# Patient Record
Sex: FEMALE | Race: WHITE | NOT HISPANIC OR LATINO | Employment: UNEMPLOYED | URBAN - NONMETROPOLITAN AREA
[De-identification: names, ages, dates, MRNs, and addresses within clinical notes are randomized per-mention and may not be internally consistent; named-entity substitution may affect disease eponyms.]

---

## 2017-05-12 ENCOUNTER — TRANSCRIBE ORDERS (OUTPATIENT)
Dept: ULTRASOUND IMAGING | Facility: HOSPITAL | Age: 37
End: 2017-05-12

## 2017-05-12 DIAGNOSIS — Z01.411 ABNORMAL FEMALE PELVIC EXAM: Primary | ICD-10-CM

## 2017-05-16 ENCOUNTER — TRANSCRIBE ORDERS (OUTPATIENT)
Dept: ADMINISTRATIVE | Facility: HOSPITAL | Age: 37
End: 2017-05-16

## 2017-05-23 ENCOUNTER — TRANSCRIBE ORDERS (OUTPATIENT)
Dept: ULTRASOUND IMAGING | Facility: HOSPITAL | Age: 37
End: 2017-05-23

## 2017-05-26 ENCOUNTER — HOSPITAL ENCOUNTER (OUTPATIENT)
Dept: ULTRASOUND IMAGING | Facility: HOSPITAL | Age: 37
Discharge: HOME | End: 2017-05-26
Admitting: NURSE PRACTITIONER

## 2017-05-26 DIAGNOSIS — Z01.411 ABNORMAL FEMALE PELVIC EXAM: ICD-10-CM

## 2017-05-26 PROCEDURE — 76830 TRANSVAGINAL US NON-OB: CPT

## 2017-06-28 ENCOUNTER — TRANSCRIBE ORDERS (OUTPATIENT)
Dept: ADMINISTRATIVE | Facility: HOSPITAL | Age: 37
End: 2017-06-28

## 2017-06-28 ENCOUNTER — APPOINTMENT (OUTPATIENT)
Dept: LAB | Facility: HOSPITAL | Age: 37
End: 2017-06-28

## 2017-06-28 DIAGNOSIS — F33.1 MAJOR DEPRESSIVE DISORDER, RECURRENT EPISODE, MODERATE (HCC): Primary | ICD-10-CM

## 2017-06-28 DIAGNOSIS — Z79.899 LONG TERM USE OF DRUG: ICD-10-CM

## 2017-06-28 LAB
BASOPHILS # BLD AUTO: 0.07 10*3/MM3 (ref 0–0.2)
BASOPHILS NFR BLD AUTO: 1 % (ref 0–2.5)
CHOLEST SERPL-MCNC: 183 MG/DL (ref 0–199)
DEPRECATED RDW RBC AUTO: 41.4 FL (ref 37–54)
EOSINOPHIL # BLD AUTO: 0.25 10*3/MM3 (ref 0–0.7)
EOSINOPHIL NFR BLD AUTO: 3.4 % (ref 0–7)
ERYTHROCYTE [DISTWIDTH] IN BLOOD BY AUTOMATED COUNT: 11.9 % (ref 11.5–14.5)
GLUCOSE BLD-MCNC: 93 MG/DL (ref 74–98)
HBA1C MFR BLD: 4.8 % (ref 3–6)
HCT VFR BLD AUTO: 41.1 % (ref 37–47)
HDLC SERPL-MCNC: 43 MG/DL (ref 40–60)
HGB BLD-MCNC: 13.7 G/DL (ref 12–16)
IMM GRANULOCYTES # BLD: 0.05 10*3/MM3 (ref 0–0.06)
IMM GRANULOCYTES NFR BLD: 0.7 % (ref 0–0.6)
LDLC SERPL CALC-MCNC: 119 MG/DL (ref 0–99)
LDLC/HDLC SERPL: 2.78 {RATIO}
LYMPHOCYTES # BLD AUTO: 2.58 10*3/MM3 (ref 0.6–3.4)
LYMPHOCYTES NFR BLD AUTO: 35.4 % (ref 10–50)
MCH RBC QN AUTO: 31.8 PG (ref 27–31)
MCHC RBC AUTO-ENTMCNC: 33.3 G/DL (ref 30–37)
MCV RBC AUTO: 95.4 FL (ref 81–99)
MONOCYTES # BLD AUTO: 0.46 10*3/MM3 (ref 0–0.9)
MONOCYTES NFR BLD AUTO: 6.3 % (ref 0–12)
NEUTROPHILS # BLD AUTO: 3.87 10*3/MM3 (ref 2–6.9)
NEUTROPHILS NFR BLD AUTO: 53.2 % (ref 37–80)
NRBC BLD MANUAL-RTO: 0 /100 WBC (ref 0–0)
PLATELET # BLD AUTO: 292 10*3/MM3 (ref 130–400)
PMV BLD AUTO: 10.1 FL (ref 6–12)
RBC # BLD AUTO: 4.31 10*6/MM3 (ref 4.2–5.4)
TRIGL SERPL-MCNC: 103 MG/DL
VLDLC SERPL-MCNC: 20.6 MG/DL
WBC NRBC COR # BLD: 7.28 10*3/MM3 (ref 4.8–10.8)

## 2017-06-28 PROCEDURE — 83036 HEMOGLOBIN GLYCOSYLATED A1C: CPT | Performed by: NURSE PRACTITIONER

## 2017-06-28 PROCEDURE — 85025 COMPLETE CBC W/AUTO DIFF WBC: CPT | Performed by: NURSE PRACTITIONER

## 2017-06-28 PROCEDURE — 36415 COLL VENOUS BLD VENIPUNCTURE: CPT | Performed by: NURSE PRACTITIONER

## 2017-06-28 PROCEDURE — 82947 ASSAY GLUCOSE BLOOD QUANT: CPT | Performed by: NURSE PRACTITIONER

## 2017-06-28 PROCEDURE — 80061 LIPID PANEL: CPT | Performed by: NURSE PRACTITIONER

## 2018-06-22 ENCOUNTER — HOSPITAL ENCOUNTER (OUTPATIENT)
Dept: CARDIOLOGY | Facility: HOSPITAL | Age: 38
Discharge: HOME | End: 2018-06-22
Admitting: NURSE PRACTITIONER

## 2018-06-22 ENCOUNTER — LAB (OUTPATIENT)
Dept: LAB | Facility: HOSPITAL | Age: 38
End: 2018-06-22

## 2018-06-22 ENCOUNTER — TRANSCRIBE ORDERS (OUTPATIENT)
Dept: LAB | Facility: HOSPITAL | Age: 38
End: 2018-06-22

## 2018-06-22 DIAGNOSIS — Z79.899 DRUG THERAPY: ICD-10-CM

## 2018-06-22 DIAGNOSIS — Z79.899 DRUG THERAPY: Primary | ICD-10-CM

## 2018-06-22 LAB
ALBUMIN SERPL-MCNC: 4.1 G/DL (ref 3.5–5)
ALBUMIN/GLOB SERPL: 1.2 G/DL (ref 1–2)
ALP SERPL-CCNC: 75 U/L (ref 38–126)
ALT SERPL W P-5'-P-CCNC: 20 U/L (ref 13–69)
ANION GAP SERPL CALCULATED.3IONS-SCNC: 13.1 MMOL/L (ref 10–20)
AST SERPL-CCNC: 18 U/L (ref 15–46)
BILIRUB SERPL-MCNC: 0.2 MG/DL (ref 0.2–1.3)
BUN BLD-MCNC: 7 MG/DL (ref 7–20)
BUN/CREAT SERPL: 8.8 (ref 7.1–23.5)
CALCIUM SPEC-SCNC: 8.9 MG/DL (ref 8.4–10.2)
CHLORIDE SERPL-SCNC: 112 MMOL/L (ref 98–107)
CHOLEST SERPL-MCNC: 168 MG/DL (ref 0–199)
CO2 SERPL-SCNC: 22 MMOL/L (ref 26–30)
CREAT BLD-MCNC: 0.8 MG/DL (ref 0.6–1.3)
DEPRECATED RDW RBC AUTO: 43 FL (ref 37–54)
ERYTHROCYTE [DISTWIDTH] IN BLOOD BY AUTOMATED COUNT: 12.1 % (ref 11.5–14.5)
GFR SERPL CREATININE-BSD FRML MDRD: 81 ML/MIN/1.73
GLOBULIN UR ELPH-MCNC: 3.5 GM/DL
GLUCOSE BLD-MCNC: 93 MG/DL (ref 74–98)
HBA1C MFR BLD: 5 % (ref 3–6)
HCT VFR BLD AUTO: 42.4 % (ref 37–47)
HDLC SERPL-MCNC: 37 MG/DL (ref 40–60)
HGB BLD-MCNC: 14.1 G/DL (ref 12–16)
LDLC SERPL CALC-MCNC: 109 MG/DL (ref 0–99)
LDLC/HDLC SERPL: 2.96 {RATIO}
MCH RBC QN AUTO: 32 PG (ref 27–31)
MCHC RBC AUTO-ENTMCNC: 33.3 G/DL (ref 30–37)
MCV RBC AUTO: 96.4 FL (ref 81–99)
PLATELET # BLD AUTO: 292 10*3/MM3 (ref 130–400)
PMV BLD AUTO: 10.5 FL (ref 6–12)
POTASSIUM BLD-SCNC: 4.1 MMOL/L (ref 3.5–5.1)
PROT SERPL-MCNC: 7.6 G/DL (ref 6.3–8.2)
RBC # BLD AUTO: 4.4 10*6/MM3 (ref 4.2–5.4)
SODIUM BLD-SCNC: 143 MMOL/L (ref 137–145)
TRIGL SERPL-MCNC: 108 MG/DL
TSH SERPL DL<=0.05 MIU/L-ACNC: 1.3 MIU/ML (ref 0.47–4.68)
VLDLC SERPL-MCNC: 21.6 MG/DL
WBC NRBC COR # BLD: 10.83 10*3/MM3 (ref 4.8–10.8)

## 2018-06-22 PROCEDURE — 80061 LIPID PANEL: CPT

## 2018-06-22 PROCEDURE — 36415 COLL VENOUS BLD VENIPUNCTURE: CPT

## 2018-06-22 PROCEDURE — 84443 ASSAY THYROID STIM HORMONE: CPT

## 2018-06-22 PROCEDURE — 85027 COMPLETE CBC AUTOMATED: CPT

## 2018-06-22 PROCEDURE — 83036 HEMOGLOBIN GLYCOSYLATED A1C: CPT

## 2018-06-22 PROCEDURE — 80053 COMPREHEN METABOLIC PANEL: CPT

## 2018-06-22 PROCEDURE — 93005 ELECTROCARDIOGRAM TRACING: CPT | Performed by: NURSE PRACTITIONER

## 2019-07-25 ENCOUNTER — TRANSCRIBE ORDERS (OUTPATIENT)
Dept: ADMINISTRATIVE | Facility: HOSPITAL | Age: 39
End: 2019-07-25

## 2019-07-25 DIAGNOSIS — G40.909 SEIZURE DISORDER (HCC): Primary | ICD-10-CM

## 2019-08-08 ENCOUNTER — HOSPITAL ENCOUNTER (OUTPATIENT)
Dept: CARDIOLOGY | Facility: HOSPITAL | Age: 39
Discharge: HOME | End: 2019-08-08

## 2019-08-08 ENCOUNTER — TRANSCRIBE ORDERS (OUTPATIENT)
Dept: CARDIOLOGY | Facility: HOSPITAL | Age: 39
End: 2019-08-08

## 2019-08-08 ENCOUNTER — HOSPITAL ENCOUNTER (OUTPATIENT)
Dept: MRI IMAGING | Facility: HOSPITAL | Age: 39
Discharge: HOME | End: 2019-08-08
Admitting: NURSE PRACTITIONER

## 2019-08-08 DIAGNOSIS — Z79.899 DRUG THERAPY: Primary | ICD-10-CM

## 2019-08-08 DIAGNOSIS — G40.909 SEIZURE DISORDER (HCC): ICD-10-CM

## 2019-08-08 DIAGNOSIS — Z79.899 DRUG THERAPY: ICD-10-CM

## 2019-08-08 PROCEDURE — 70551 MRI BRAIN STEM W/O DYE: CPT

## 2019-08-08 PROCEDURE — 93005 ELECTROCARDIOGRAM TRACING: CPT | Performed by: NURSE PRACTITIONER

## 2020-03-16 ENCOUNTER — TRANSCRIBE ORDERS (OUTPATIENT)
Dept: CARDIOLOGY | Facility: HOSPITAL | Age: 40
End: 2020-03-16

## 2020-03-16 ENCOUNTER — HOSPITAL ENCOUNTER (OUTPATIENT)
Dept: CARDIOLOGY | Facility: HOSPITAL | Age: 40
Discharge: HOME | End: 2020-03-16
Admitting: NURSE PRACTITIONER

## 2020-03-16 DIAGNOSIS — Z79.899 DRUG THERAPY: Primary | ICD-10-CM

## 2020-03-16 DIAGNOSIS — Z79.899 DRUG THERAPY: ICD-10-CM

## 2020-03-16 PROCEDURE — 93005 ELECTROCARDIOGRAM TRACING: CPT | Performed by: NURSE PRACTITIONER

## 2020-10-12 ENCOUNTER — LAB (OUTPATIENT)
Dept: LAB | Facility: HOSPITAL | Age: 40
End: 2020-10-12

## 2020-10-12 ENCOUNTER — TRANSCRIBE ORDERS (OUTPATIENT)
Dept: LAB | Facility: HOSPITAL | Age: 40
End: 2020-10-12

## 2020-10-12 DIAGNOSIS — Z79.899 ENCOUNTER FOR LONG-TERM (CURRENT) USE OF OTHER MEDICATIONS: ICD-10-CM

## 2020-10-12 DIAGNOSIS — Z79.899 ENCOUNTER FOR LONG-TERM (CURRENT) USE OF OTHER MEDICATIONS: Primary | ICD-10-CM

## 2020-10-12 LAB
ALBUMIN SERPL-MCNC: 4.1 G/DL (ref 3.5–5.2)
ALBUMIN/GLOB SERPL: 1.4 G/DL
ALP SERPL-CCNC: 83 U/L (ref 39–117)
ALT SERPL W P-5'-P-CCNC: 12 U/L (ref 1–33)
ANION GAP SERPL CALCULATED.3IONS-SCNC: 8.3 MMOL/L (ref 5–15)
AST SERPL-CCNC: 13 U/L (ref 1–32)
BILIRUB SERPL-MCNC: 0.2 MG/DL (ref 0–1.2)
BUN SERPL-MCNC: 12 MG/DL (ref 6–20)
BUN/CREAT SERPL: 16.4 (ref 7–25)
CALCIUM SPEC-SCNC: 9 MG/DL (ref 8.6–10.5)
CHLORIDE SERPL-SCNC: 112 MMOL/L (ref 98–107)
CHOLEST SERPL-MCNC: 185 MG/DL (ref 0–200)
CO2 SERPL-SCNC: 20.7 MMOL/L (ref 22–29)
CREAT SERPL-MCNC: 0.73 MG/DL (ref 0.57–1)
DEPRECATED RDW RBC AUTO: 39.7 FL (ref 37–54)
ERYTHROCYTE [DISTWIDTH] IN BLOOD BY AUTOMATED COUNT: 11.7 % (ref 12.3–15.4)
GFR SERPL CREATININE-BSD FRML MDRD: 89 ML/MIN/1.73
GLOBULIN UR ELPH-MCNC: 3 GM/DL
GLUCOSE SERPL-MCNC: 92 MG/DL (ref 65–99)
HBA1C MFR BLD: 5.2 % (ref 4.8–5.6)
HCT VFR BLD AUTO: 44.1 % (ref 34–46.6)
HDLC SERPL-MCNC: 41 MG/DL (ref 40–60)
HGB BLD-MCNC: 14.7 G/DL (ref 12–15.9)
LDLC SERPL CALC-MCNC: 122 MG/DL (ref 0–100)
LDLC/HDLC SERPL: 2.92 {RATIO}
MCH RBC QN AUTO: 30.8 PG (ref 26.6–33)
MCHC RBC AUTO-ENTMCNC: 33.3 G/DL (ref 31.5–35.7)
MCV RBC AUTO: 92.5 FL (ref 79–97)
PLATELET # BLD AUTO: 325 10*3/MM3 (ref 140–450)
PMV BLD AUTO: 11 FL (ref 6–12)
POTASSIUM SERPL-SCNC: 3.9 MMOL/L (ref 3.5–5.2)
PROT SERPL-MCNC: 7.1 G/DL (ref 6–8.5)
RBC # BLD AUTO: 4.77 10*6/MM3 (ref 3.77–5.28)
SODIUM SERPL-SCNC: 141 MMOL/L (ref 136–145)
TRIGL SERPL-MCNC: 122 MG/DL (ref 0–150)
TSH SERPL DL<=0.05 MIU/L-ACNC: 1.76 UIU/ML (ref 0.27–4.2)
VLDLC SERPL-MCNC: 22 MG/DL (ref 5–40)
WBC # BLD AUTO: 10.27 10*3/MM3 (ref 3.4–10.8)

## 2020-10-12 PROCEDURE — 83036 HEMOGLOBIN GLYCOSYLATED A1C: CPT

## 2020-10-12 PROCEDURE — 80061 LIPID PANEL: CPT

## 2020-10-12 PROCEDURE — 80053 COMPREHEN METABOLIC PANEL: CPT

## 2020-10-12 PROCEDURE — 36415 COLL VENOUS BLD VENIPUNCTURE: CPT

## 2020-10-12 PROCEDURE — 84443 ASSAY THYROID STIM HORMONE: CPT

## 2020-10-12 PROCEDURE — 85027 COMPLETE CBC AUTOMATED: CPT

## 2021-02-04 ENCOUNTER — TRANSCRIBE ORDERS (OUTPATIENT)
Dept: MAMMOGRAPHY | Facility: HOSPITAL | Age: 41
End: 2021-02-04

## 2021-02-04 DIAGNOSIS — Z12.31 VISIT FOR SCREENING MAMMOGRAM: Primary | ICD-10-CM

## 2021-02-25 ENCOUNTER — OFFICE VISIT (OUTPATIENT)
Dept: OTOLARYNGOLOGY | Facility: CLINIC | Age: 41
End: 2021-02-25

## 2021-02-25 VITALS
BODY MASS INDEX: 35.74 KG/M2 | SYSTOLIC BLOOD PRESSURE: 104 MMHG | WEIGHT: 177.3 LBS | HEIGHT: 59 IN | DIASTOLIC BLOOD PRESSURE: 66 MMHG

## 2021-02-25 DIAGNOSIS — H91.93 BILATERAL HEARING LOSS, UNSPECIFIED HEARING LOSS TYPE: ICD-10-CM

## 2021-02-25 DIAGNOSIS — H61.23 BILATERAL IMPACTED CERUMEN: ICD-10-CM

## 2021-02-25 DIAGNOSIS — H91.93 DEAF, BILATERAL: Primary | ICD-10-CM

## 2021-02-25 DIAGNOSIS — Z82.79: ICD-10-CM

## 2021-02-25 DIAGNOSIS — Z82.2 FAMILY HISTORY OF DEAFNESS OR HEARING LOSS: ICD-10-CM

## 2021-02-25 DIAGNOSIS — Z98.2 VP (VENTRICULOPERITONEAL) SHUNT STATUS: ICD-10-CM

## 2021-02-25 DIAGNOSIS — J34.89 NASAL OBSTRUCTION: ICD-10-CM

## 2021-02-25 DIAGNOSIS — H90.5 CONGENITAL HEARING LOSS OF BOTH EARS: ICD-10-CM

## 2021-02-25 PROCEDURE — 99205 OFFICE O/P NEW HI 60 MIN: CPT | Performed by: OTOLARYNGOLOGY

## 2021-02-25 PROCEDURE — 69210 REMOVE IMPACTED EAR WAX UNI: CPT | Performed by: OTOLARYNGOLOGY

## 2021-02-25 RX ORDER — QUETIAPINE FUMARATE 400 MG/1
TABLET, FILM COATED ORAL
COMMUNITY
Start: 2021-02-23

## 2021-02-25 RX ORDER — BUSPIRONE HYDROCHLORIDE 30 MG/1
TABLET ORAL
COMMUNITY
Start: 2020-12-18

## 2021-02-25 RX ORDER — PROPRANOLOL HYDROCHLORIDE 10 MG/1
TABLET ORAL
COMMUNITY
Start: 2021-02-23

## 2021-02-25 RX ORDER — VORTIOXETINE 20 MG/1
TABLET, FILM COATED ORAL
COMMUNITY
Start: 2020-12-18

## 2021-02-25 RX ORDER — PRAZOSIN HYDROCHLORIDE 2 MG/1
CAPSULE ORAL
COMMUNITY
Start: 2021-02-10 | End: 2021-02-25

## 2021-02-25 RX ORDER — PRAZOSIN HYDROCHLORIDE 2 MG/1
2 CAPSULE ORAL NIGHTLY
COMMUNITY

## 2021-03-12 ENCOUNTER — HOSPITAL ENCOUNTER (OUTPATIENT)
Dept: MRI IMAGING | Facility: HOSPITAL | Age: 41
Discharge: HOME | End: 2021-03-12

## 2021-03-12 ENCOUNTER — HOSPITAL ENCOUNTER (OUTPATIENT)
Dept: CT IMAGING | Facility: HOSPITAL | Age: 41
Discharge: HOME | End: 2021-03-12

## 2021-03-12 DIAGNOSIS — H91.93 DEAF, BILATERAL: ICD-10-CM

## 2021-03-12 DIAGNOSIS — Z82.2 FAMILY HISTORY OF DEAFNESS OR HEARING LOSS: ICD-10-CM

## 2021-03-12 DIAGNOSIS — Z82.79: ICD-10-CM

## 2021-03-12 PROCEDURE — A9577 INJ MULTIHANCE: HCPCS | Performed by: OTOLARYNGOLOGY

## 2021-03-12 PROCEDURE — 70553 MRI BRAIN STEM W/O & W/DYE: CPT

## 2021-03-12 PROCEDURE — 0 GADOBENATE DIMEGLUMINE 529 MG/ML SOLUTION: Performed by: OTOLARYNGOLOGY

## 2021-03-12 PROCEDURE — 70480 CT ORBIT/EAR/FOSSA W/O DYE: CPT

## 2021-03-12 RX ADMIN — GADOBENATE DIMEGLUMINE 15 ML: 529 INJECTION, SOLUTION INTRAVENOUS at 10:42

## 2021-04-22 ENCOUNTER — OFFICE VISIT (OUTPATIENT)
Dept: OTOLARYNGOLOGY | Facility: CLINIC | Age: 41
End: 2021-04-22

## 2021-04-22 VITALS
WEIGHT: 181 LBS | DIASTOLIC BLOOD PRESSURE: 74 MMHG | BODY MASS INDEX: 36.49 KG/M2 | SYSTOLIC BLOOD PRESSURE: 106 MMHG | TEMPERATURE: 97.1 F | HEIGHT: 59 IN

## 2021-04-22 DIAGNOSIS — F32.2 CURRENT SEVERE EPISODE OF MAJOR DEPRESSIVE DISORDER WITHOUT PSYCHOTIC FEATURES, UNSPECIFIED WHETHER RECURRENT (HCC): ICD-10-CM

## 2021-04-22 DIAGNOSIS — Z82.2 FAMILY HISTORY OF DEAFNESS OR HEARING LOSS: ICD-10-CM

## 2021-04-22 DIAGNOSIS — H61.23 BILATERAL IMPACTED CERUMEN: ICD-10-CM

## 2021-04-22 DIAGNOSIS — H90.5 CONGENITAL HEARING LOSS OF BOTH EARS: ICD-10-CM

## 2021-04-22 DIAGNOSIS — H91.93 BILATERAL HEARING LOSS, UNSPECIFIED HEARING LOSS TYPE: ICD-10-CM

## 2021-04-22 DIAGNOSIS — Z98.2 VP (VENTRICULOPERITONEAL) SHUNT STATUS: ICD-10-CM

## 2021-04-22 DIAGNOSIS — H91.93 DEAF, BILATERAL: Primary | ICD-10-CM

## 2021-04-22 DIAGNOSIS — Z82.79: ICD-10-CM

## 2021-04-22 DIAGNOSIS — J34.89 NASAL OBSTRUCTION: ICD-10-CM

## 2021-04-22 PROCEDURE — 99214 OFFICE O/P EST MOD 30 MIN: CPT | Performed by: OTOLARYNGOLOGY

## 2021-04-22 RX ORDER — TOPIRAMATE 50 MG/1
50 TABLET, FILM COATED ORAL 2 TIMES DAILY
COMMUNITY
Start: 2021-03-31

## 2021-04-22 NOTE — PROGRESS NOTES
"       ENT New Office Consult Note     Date of Consult: 2021     Patient Name: Bryce Walker  MRN: [id number]   : 1980     Referring Provider: No ref. provider found    Care Team: Patient Care Team:  Steffany Silvestre APRN as PCP - General (Family Medicine)  Violetta Wheat APRN as Nurse Practitioner (Family Medicine)     Chief Complaint:    Chief Complaint   Patient presents with   • Follow-up     Patient is here to follow up on test results       History of Present Illness: Bryce Walker is a 40 y.o. female who presents today for FOLLOWUP EARS.      SINCE LAST VISIT CT TEMPORAL BONE AND MRI IAC W/WO HAVE BEEN PERFORMED. AUDIOGRAM PERFORMED BUT PENDING REVIEW FROM [facility].    NO NEW SYMPTOMS SINCE LAST VISIT.    PATIENT WAS SEEN WITH .        2021:  Bryce Walker is a 40 y.o. female who presents today for ears.  SHE HAS A COMPLEX OTOLOGIC HISTORY AS OUTLINED BELOW. SHE WAS SEEN WITH .      BRYCE WAS BORN 3MO PREMATURE, IN NICU FOR 3MO. UNSURE ABOUT  HEARING SCREEN. SHE THINKS SHE WAS BORN WITH \"NORMAL\" HEARING. SHE REPORTS LOSING HER HEARING AT A VERY YOUNG AGE. SHE HAS USED HEARING AIDS FOR YEARS BUT COMMUNICATES ENTIRELY NOW BY ASL. SHE HAS A HISTORY OF  SHUNT. RIGHT EAR SUDDEN LOSS AT 20YO. 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 THIS WAS IN  - SHE WANTED HEARING AIDS BUT HER PARENTS SAID NO. MOM KNOWS ASL A BIT AND SHE IS HARD OF HEARING.      MOM WAS BORN WITH HEARING LOSS. DAD BORN HEARING. MOTHER'S MOTHER'S SISTER (MGM SISTER) HAS WAARDENBURG SYNDROME.     FAMILY HISTORY OF WAARDENBURG SYNDROME. MOTHER HAD ACOUSTIC NEUROMA ONLY ON ONE SIDE.      SHE WONDERS ABOUT MANY THING INCLUDING: WHETHER SHE ALSO HAS A TUMOR LIKE HER MOTHER, WHETHER SHE COULD GET COCHLEAR IMPLANTS, WHETHER SHE HAS A GENETIC CONDITION THAT COULD BE PASSED DOWN TO ANY FUTURE CHILDREN (NO CHILDREN AT " THIS POINT).         SHE SEPARATELY HAS WRITTEN NOTES ABOUT NASAL OBSTRUCTION/FRACTURES WHICH WAS NOT ADDRESSED AT THIS VISIT.          Subjective      Review of Systems:   Review of Systems   I have reviewed and confirmed the accuracy of the ROS as documented by the MA/LPN/RN Tamika Cueto MD     Pertinent items are noted in HPI.     Past Medical History:   Past Medical History:   Diagnosis Date   • Anemia    • Anxiety    • Depression    • Headache    • Hydrocephalus (CMS/HCC)    • Psychiatric illness    • Seizures (CMS/HCC)    • Sleep related rhythmic movement disorder    • Stomach ulcer    • Suicide attempt (CMS/HCC)        Past Surgical History:   Past Surgical History:   Procedure Laterality Date   • BRAIN SURGERY  2009, 1990, 1993,   • BUNIONECTOMY Left 2001   • CYST REMOVAL  1996    L wrist   •  SHUNT INSERTION  1980       Family History:   Family History   Problem Relation Age of Onset   • Bipolar disorder Mother    • Alcohol abuse Mother        Social History:   Social History     Socioeconomic History   • Marital status: Single     Spouse name: Not on file   • Number of children: Not on file   • Years of education: Not on file   • Highest education level: Not on file   Tobacco Use   • Smoking status: Never Smoker   • Smokeless tobacco: Never Used   Vaping Use   • Vaping Use: Never used   Substance and Sexual Activity   • Alcohol use: No   • Drug use: No   • Sexual activity: Defer       Medications:     Current Outpatient Medications:   •  busPIRone (BUSPAR) 30 MG tablet, , Disp: , Rfl:   •  prazosin (MINIPRESS) 2 MG capsule, Take 2 mg by mouth Every Night., Disp: , Rfl:   •  propranolol (INDERAL) 10 MG tablet, , Disp: , Rfl:   •  QUEtiapine (SEROquel) 400 MG tablet, , Disp: , Rfl:   •  topiramate (TOPAMAX) 50 MG tablet, Take 50 mg by mouth 2 (Two) Times a Day., Disp: , Rfl:   •  Trintellix 20 MG tablet, , Disp: , Rfl:     Allergies:   Allergies   Allergen Reactions   • Geodon [Ziprasidone Hcl]  "Other (See Comments)     Pt becomes hypertensive and develops angioedema   • Medroxyprogesterone Swelling   • Milk-Related Compounds GI Intolerance   • Prednisone Hives   • Morphine And Related Rash   • Nickel Rash   • Risperidone And Related Rash   • Sumatriptan Rash       Objective     Physical Exam:  Vital Signs:   Vitals:    04/22/21 1115   BP: 106/74   Temp: 97.1 °F (36.2 °C)   Weight: 82.1 kg (181 lb)   Height: 149.9 cm (59\")     Body mass index is 36.56 kg/m².     Physical Exam   Ears: hearing with SIGNIFICANT difficulty, auricles intact without deformity, external auditory canals clear    RIGHT: tympanic membrane intact without perforation or visible effusion NO VISIBLE EAC MASSES    LEFT: tympanic membrane intact without perforation or visible effusion    General: alert, interactive, no acute distress  Head: normocephalic, atraumatic  Nose: no significant external deformity, no epistaxis   Mouth: lips intact without deformity no oral mucosal lesions of concern, no oropharyngeal lesions or significant tonsillar asymmetry, no bleeding  Neck: trachea midline, no neck asymmetry   Lung: no stridor or stertor, no respiratory distress  Cardiovascular: no cyanosis  Skin: no concerning skin lesions on face  Neuro: Cranial nerves II-XII otherwise grossly intact  Eye: no pathologic nystagmus  Extremities: no motor asymmetry on gross examination  Psych: appropriately interactive       Results Review:   Labs:      Imaging:   MRI Internal Auditory Canal With Wo    Result Date: 3/12/2021  1. No evidence of a cerebellopontine angle mass or normal enhancement involving the distal fibula cochlear nerves. 2. 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    This " report was finalized on 3/12/2021 11:36 AM by Vannesa Chandler M.D..        CT Temporal Bones Without Contrast    Result Date: 3/15/2021  Unremarkable CT of the temporal bones.     This study was performed with techniques to keep radiation doses as low as reasonably achievable (ALARA). Individualized dose reduction techniques using automated exposure control or adjustment of mA and/or kV according to the patient size were employed.   This report was finalized on 3/15/2021 3:13 PM by Vannesa Chandler M.D..      These studies were independently reviewed today.         REVIEWED WITH PATIENT IN CLINIC TODAY AS WELL           AUDIOLOGY REPORT PENDING REVIEW (BEING FAXED NOW)                  Assessment / Plan      Assessment/Plan:   Diagnoses and all orders for this visit:    1. Deaf, bilateral (Primary)  -     Ambulatory Referral to ENT (Otolaryngology)  -     Ambulatory Referral to Genetics    2. Bilateral hearing loss, unspecified hearing loss type    3. Congenital hearing loss of both ears    4. Nasal obstruction    5. Bilateral impacted cerumen    6. Family history of deafness or hearing loss    7. Family history of Waardenburg syndrome    8. Current severe episode of major depressive disorder without psychotic features, unspecified whether recurrent (CMS/HCC)    9.  (ventriculoperitoneal) shunt status         REVIEWED CT TEMPORAL BONE AND MRI IAC W/WO WITH BRYCE TODAY. NO OVERT ABNORMALITIES TO SUGGEST VESTIBULAR SCHWANNOMAS (LIKE HER MOTHER HAD) OR OBVIOUS INNER EAR MALFORMATION TO EXPLAIN HER HEARING LOSS.     RECOMMEND REFERRAL TO [facility] FOR PATIENT'S INTEREST IN GENETIC EVALUATION IN SETTING OF FAMILY HISTORY (HEARING LOSS, WAARDENBURG, ACOUSTIC NEUROMA).     **    AUDIOGRAM WAS RECEIVED AFTER PATIENT DISCHARGE FROM CLINIC TODAY:  4/12/21 [facility] HEARING (SEE ABOVE) SHOWS RIGHT NORMAL SLOPING TO MILD-MOD SNHL 92% WRS. LEFT MILD-MOD SNHL, 96% WRS.   WITH THIS INFORMATION, SHE IS BEST ADVISED TO  CONTINUE HEARING AID USE. SHE CAN CONTINUE TO COMMUNICATE WITH ASL AS IS HOW WE HAVE COMMUNICATED IN MY OFFICE. I AM HAPPY TO SEE HER BACK FOR EAR CLEANING/EXAM IN 1 YEAR.     SHE EXPRESSED INTEREST TODAY RE: COCHLEAR IMPLANTATION BUT AUDIOGRAM RECEIVED FROM OUTSIDE TODAY DOES NOT SUPPORT. CONSIDERING FAMILY HISTORY I THINK GENETIC WORKUP IS REASONABLE.     Follow Up:   Return in about 1 year (around 4/22/2022).    TRU Cueto MD

## 2021-06-02 ENCOUNTER — TRANSCRIBE ORDERS (OUTPATIENT)
Dept: ADMINISTRATIVE | Facility: HOSPITAL | Age: 41
End: 2021-06-02

## 2021-06-02 DIAGNOSIS — S09.90XA INJURY, HEAD, INITIAL ENCOUNTER: Primary | ICD-10-CM

## 2021-06-25 ENCOUNTER — TRANSCRIBE ORDERS (OUTPATIENT)
Dept: ADMINISTRATIVE | Facility: HOSPITAL | Age: 41
End: 2021-06-25

## 2021-06-25 DIAGNOSIS — S09.90XA INJURIES TO THE HEAD, INITIAL ENCOUNTER: Primary | ICD-10-CM

## 2022-02-15 ENCOUNTER — TRANSCRIBE ORDERS (OUTPATIENT)
Dept: ADMINISTRATIVE | Facility: HOSPITAL | Age: 42
End: 2022-02-15

## 2022-02-15 DIAGNOSIS — Z12.31 ENCOUNTER FOR SCREENING MAMMOGRAM FOR MALIGNANT NEOPLASM OF BREAST: Primary | ICD-10-CM

## 2022-03-08 ENCOUNTER — OFFICE VISIT (OUTPATIENT)
Dept: UROLOGY | Facility: CLINIC | Age: 42
End: 2022-03-08

## 2022-03-08 VITALS
SYSTOLIC BLOOD PRESSURE: 104 MMHG | WEIGHT: 181 LBS | HEIGHT: 59 IN | OXYGEN SATURATION: 96 % | HEART RATE: 130 BPM | TEMPERATURE: 97.2 F | DIASTOLIC BLOOD PRESSURE: 68 MMHG | BODY MASS INDEX: 36.49 KG/M2

## 2022-03-08 DIAGNOSIS — R35.0 URINARY FREQUENCY: ICD-10-CM

## 2022-03-08 DIAGNOSIS — N30.01 ACUTE CYSTITIS WITH HEMATURIA: Primary | ICD-10-CM

## 2022-03-08 DIAGNOSIS — Z98.2 VP (VENTRICULOPERITONEAL) SHUNT STATUS: ICD-10-CM

## 2022-03-08 LAB
BILIRUB BLD-MCNC: NEGATIVE MG/DL
CLARITY, POC: CLEAR
COLOR UR: ABNORMAL
EXPIRATION DATE: ABNORMAL
GLUCOSE UR STRIP-MCNC: NEGATIVE MG/DL
KETONES UR QL: ABNORMAL
LEUKOCYTE EST, POC: NEGATIVE
Lab: ABNORMAL
NITRITE UR-MCNC: NEGATIVE MG/ML
PH UR: 6 [PH] (ref 5–8)
PROT UR STRIP-MCNC: ABNORMAL MG/DL
RBC # UR STRIP: NEGATIVE /UL
SP GR UR: 1.03 (ref 1–1.03)
UROBILINOGEN UR QL: NORMAL

## 2022-03-08 PROCEDURE — 99204 OFFICE O/P NEW MOD 45 MIN: CPT | Performed by: PHYSICIAN ASSISTANT

## 2022-03-08 RX ORDER — RIZATRIPTAN BENZOATE 10 MG/1
10 TABLET ORAL
COMMUNITY
Start: 2021-08-16 | End: 2022-03-08 | Stop reason: SDUPTHER

## 2022-03-08 RX ORDER — FEXOFENADINE HCL 180 MG/1
180 TABLET ORAL DAILY
COMMUNITY
Start: 2022-02-08

## 2022-03-08 RX ORDER — AZELASTINE HYDROCHLORIDE 137 UG/1
SPRAY, METERED NASAL
COMMUNITY
Start: 2021-11-29

## 2022-03-08 RX ORDER — TOPIRAMATE 50 MG/1
1 TABLET, FILM COATED ORAL 2 TIMES DAILY
COMMUNITY
Start: 2021-08-16 | End: 2022-03-08 | Stop reason: SDUPTHER

## 2022-03-08 RX ORDER — OXYBUTYNIN CHLORIDE 10 MG/1
10 TABLET, EXTENDED RELEASE ORAL DAILY
Qty: 30 TABLET | Refills: 2 | Status: SHIPPED | OUTPATIENT
Start: 2022-03-08 | End: 2022-05-03

## 2022-03-08 NOTE — PROGRESS NOTES
"Chief Complaint  Chief Complaint   Patient presents with   • Urinary Incontinence     New Patient here with urinary Incontinence        Referring Provider  Harini Cintron APRN HPI  Ms. Walker is a 41 y.o. female presents with complaint of urinary urgency for her entire life. Has a  shunt for congenital hydrocephalus, last changed in 2009. She is concerned that her  shunt is obstructed. Noticed a change in balance about 2 weeks ago. She does not yet have a follow up with neurosurgery, has been seeing neuro at , denies recent cranial imaging.    Pt has primarily urge and frequency, 2 hours or less between the urge to void, Has only had UUI x2 ever. Eats pickles to help her \"bladder expand\", but not experiencing relief. Has otherwise not tried anything in the past.     Severity: Pt uses 0-1 pads a day and has tried nothing in the past  Associated Sx: Pt has h/o daytime frequency, urgency. Has nocturnal enuresis, two times ever.      No h/o poor stream, straining, hesitancy or incomplete voiding.     No h/o recurrent UTI, hematuria, nephrolithiasis    No vaginal discharge or bleeding.  No h/o something coming out of vagina   No Constipation  No Vaginal deliveries    Past Medical History  Past Medical History:   Diagnosis Date   • Anemia    • Anxiety    • Depression    • Headache    • Hydrocephalus (HCC)    • Psychiatric illness    • Seizures (HCC)    • Sleep related rhythmic movement disorder    • Stomach ulcer    • Suicide attempt (HCC)    • Urinary tract infection        Past Surgical History  Past Surgical History:   Procedure Laterality Date   • BRAIN SURGERY  2009, 1990, 1993,   • BUNIONECTOMY Left 2001   • CYST REMOVAL  1996    L wrist   •  SHUNT INSERTION  1980       Medications  Current Outpatient Medications   Medication Sig Dispense Refill   • Azelastine HCl 137 MCG/SPRAY solution INSTILL 1-2 SPRAYS INTO EACH NOSTRIL TWICE DAILY AS NEEDED     • busPIRone (BUSPAR) 30 MG tablet      • prazosin " "(MINIPRESS) 2 MG capsule Take 2 mg by mouth Every Night.     • propranolol (INDERAL) 10 MG tablet      • QUEtiapine (SEROquel) 400 MG tablet      • topiramate (TOPAMAX) 50 MG tablet Take 50 mg by mouth 2 (Two) Times a Day.     • Trintellix 20 MG tablet      • fexofenadine (ALLEGRA) 180 MG tablet Take 180 mg by mouth Daily.       No current facility-administered medications for this visit.       Allergies  Allergies   Allergen Reactions   • Geodon [Ziprasidone Hcl] Other (See Comments)     Pt becomes hypertensive and develops angioedema   • Medroxyprogesterone Swelling   • Milk-Related Compounds GI Intolerance   • Prednisone Hives   • Morphine And Related Rash   • Nickel Rash   • Risperidone And Related Rash   • Sumatriptan Rash       Social History  Social History     Socioeconomic History   • Marital status: Single   Tobacco Use   • Smoking status: Never Smoker   • Smokeless tobacco: Never Used   Vaping Use   • Vaping Use: Never used   Substance and Sexual Activity   • Alcohol use: No   • Drug use: No   • Sexual activity: Defer       Family History  Family History   Problem Relation Age of Onset   • Bipolar disorder Mother    • Alcohol abuse Mother          Physical Exam  Visit Vitals  /68   Pulse (!) 130   Temp 97.2 °F (36.2 °C)   Ht 149.9 cm (59\")   Wt 82.1 kg (181 lb)   SpO2 96%   Breastfeeding No   BMI 36.56 kg/m²       Labs  Brief Urine Lab Results  (Last result in the past 365 days)      Color   Clarity   Blood   Leuk Est   Nitrite   Protein   CREAT   Urine HCG        03/08/22 1014 Silva   Clear   Negative   Negative   Negative   Trace                 Lab Results   Component Value Date    GLUCOSE 92 10/12/2020    CALCIUM 9.0 10/12/2020     10/12/2020    K 3.9 10/12/2020    CO2 20.7 (L) 10/12/2020     (H) 10/12/2020    BUN 12 10/12/2020    CREATININE 0.73 10/12/2020    EGFRIFNONA 89 10/12/2020    BCR 16.4 10/12/2020    ANIONGAP 8.3 10/12/2020       Lab Results   Component Value Date    WBC " "10.27 10/12/2020    HGB 14.7 10/12/2020    HCT 44.1 10/12/2020    MCV 92.5 10/12/2020     10/12/2020       PVR  Post-void residual performed by staff - 0cc    I have personally reviewed her labs and post void residual imaging.     Assessment  Ms. Walker is a 41 y.o. female with urinary urgency and frequency. Her PVR is 0 and her UA is benign. Her symptoms seem most consistent with OAB rather than obstructive hydrocephalus. She is able to walk nearly toe-heel in the office (does struggle after 1-2 steps, but says \"I've always had bad balance). Her gait is otherwise normal. Her cognitive function is grossly intact with remote and recent memory easily accessed. She denies nocturnal enuresis or total incontinence with UUI happening only twice ever. That said, she is greatly concerned about  shunt malfunction. I will obtain a CT head to evaluate for signs of increasing hydrocephalus. I will refer the patient to NSG at  so she can continue her follow up. If CT head shows acute findings, I will contact the patient.       Plan  1. Urinary frequency and urgency   - start oxybutynin 10mg XL daily   - obtain CT head without contrast to r/o  shunt malfunction, acute hydrocephalus     FU in 2 months     "

## 2022-03-14 ENCOUNTER — TRANSCRIBE ORDERS (OUTPATIENT)
Dept: ADMINISTRATIVE | Facility: HOSPITAL | Age: 42
End: 2022-03-14

## 2022-03-14 DIAGNOSIS — Z12.31 ENCOUNTER FOR SCREENING MAMMOGRAM FOR MALIGNANT NEOPLASM OF BREAST: Primary | ICD-10-CM

## 2022-03-15 ENCOUNTER — TELEPHONE (OUTPATIENT)
Dept: UROLOGY | Facility: CLINIC | Age: 42
End: 2022-03-15

## 2022-03-15 NOTE — TELEPHONE ENCOUNTER
Caller: DANIELA WITH [facility]      Best call back number: [phone]    Patient is needing: PLEASE CALL BACK REGARDING REFERRAL SENT HERE FOR THIS PT. STATES REFERRAL IS INCOMPLETE.

## 2022-04-28 ENCOUNTER — OFFICE VISIT (OUTPATIENT)
Dept: OTOLARYNGOLOGY | Facility: CLINIC | Age: 42
End: 2022-04-28

## 2022-04-28 VITALS
WEIGHT: 162.8 LBS | SYSTOLIC BLOOD PRESSURE: 124 MMHG | BODY MASS INDEX: 32.82 KG/M2 | HEIGHT: 59 IN | DIASTOLIC BLOOD PRESSURE: 76 MMHG

## 2022-04-28 DIAGNOSIS — J34.89 NASAL OBSTRUCTION: ICD-10-CM

## 2022-04-28 DIAGNOSIS — J34.2 DEVIATED NASAL SEPTUM: ICD-10-CM

## 2022-04-28 DIAGNOSIS — Z98.2 S/P VP SHUNT: ICD-10-CM

## 2022-04-28 DIAGNOSIS — Z82.2 FAMILY HISTORY OF DEAFNESS OR HEARING LOSS: Primary | ICD-10-CM

## 2022-04-28 DIAGNOSIS — Z98.2 VP (VENTRICULOPERITONEAL) SHUNT STATUS: ICD-10-CM

## 2022-04-28 DIAGNOSIS — J34.3 NASAL TURBINATE HYPERTROPHY: ICD-10-CM

## 2022-04-28 PROCEDURE — 99214 OFFICE O/P EST MOD 30 MIN: CPT | Performed by: OTOLARYNGOLOGY

## 2022-04-28 PROCEDURE — 31231 NASAL ENDOSCOPY DX: CPT | Performed by: OTOLARYNGOLOGY

## 2022-04-28 RX ORDER — LORATADINE 10 MG/1
10 TABLET ORAL DAILY
COMMUNITY
Start: 2022-03-25

## 2022-04-28 RX ORDER — AZELASTINE 1 MG/ML
2 SPRAY, METERED NASAL 2 TIMES DAILY
Qty: 30 ML | Refills: 5 | Status: SHIPPED | OUTPATIENT
Start: 2022-04-28

## 2022-04-28 RX ORDER — RIZATRIPTAN BENZOATE 10 MG/1
TABLET ORAL
COMMUNITY
Start: 2022-04-22

## 2022-04-28 RX ORDER — AZELASTINE HYDROCHLORIDE 137 UG/1
1 SPRAY, METERED NASAL 2 TIMES DAILY
Qty: 10 ML | Refills: 1 | Status: CANCELLED | OUTPATIENT
Start: 2022-04-28

## 2022-04-28 NOTE — PROGRESS NOTES
ENT New Office Consult Note     Date of Consult: 2022     Patient Name: Silva Walker  MRN: [id number]   : 1980     Referring Provider: No ref. provider found    Care Team: Patient Care Team:  Steffany Silvestre APRN as PCP - General (Family Medicine)  Violetta Wheat APRN as Nurse Practitioner (Family Medicine)     Chief Complaint:    Chief Complaint   Patient presents with   • Follow-up   • Hearing Loss   • Shortness of Breath       History of Present Illness: Silva Walker is a 41 y.o. female who presents today for NOSE.        SILVA IS SEEN WITH  TODAY. PREVIOUS DISCHARGE FROM [facility] HEARING W/ MILD-MOD SNHL AND SHE WEARS HEARING AIDS.    SILVA REPORTS NASAL OBSTRUCTION FOR YEARS. SHE GETS NOSEBLEEDS WITH FLONASE. USES DAILY CLARITIN FOR YEARS WITH NO RELIEF. SHE IS EAGER FOR SURGICAL TREATMENT FOR NASAL OBSTRUCTION.    SHE HAS ANXIETY AND PANIC ATTACKS. NO PULMONARY DIAGNOSES. DIFFICULTY BREATHING THROUGH MOUTH AT TIMES.      SHUNT PLACED AT .       Subjective      Review of Systems:   Review of Systems   HENT: Positive for congestion, hearing loss and sinus pressure. Negative for dental problem, drooling, ear discharge, ear pain, facial swelling, mouth sores, nosebleeds, postnasal drip, rhinorrhea, sneezing, sore throat, swollen glands, tinnitus, trouble swallowing and voice change.       I have reviewed and confirmed the accuracy of the ROS as documented by the MA/LPN/RN Tamika Cueto MD     Pertinent items are noted in HPI.     Past Medical History:   Past Medical History:   Diagnosis Date   • Anemia    • Anxiety    • Depression    • Headache    • Hydrocephalus (HCC)    • Psychiatric illness    • Seizures (HCC)    • Sleep related rhythmic movement disorder    • Stomach ulcer    • Suicide attempt (HCC)    • Urinary tract infection        Past Surgical History:   Past Surgical History:   Procedure Laterality Date   • BRAIN SURGERY  , , ,   •  BUNIONECTOMY Left 2001   • CYST REMOVAL  1996    L wrist   •  SHUNT INSERTION  1980       Family History:   Family History   Problem Relation Age of Onset   • Bipolar disorder Mother    • Alcohol abuse Mother        Social History:   Social History     Socioeconomic History   • Marital status: Single   Tobacco Use   • Smoking status: Never Smoker   • Smokeless tobacco: Never Used   Vaping Use   • Vaping Use: Never used   Substance and Sexual Activity   • Alcohol use: No   • Drug use: No   • Sexual activity: Defer       Medications:     Current Outpatient Medications:   •  busPIRone (BUSPAR) 30 MG tablet, , Disp: , Rfl:   •  loratadine (CLARITIN) 10 MG tablet, Take 10 mg by mouth Daily., Disp: , Rfl:   •  oxybutynin XL (Ditropan XL) 10 MG 24 hr tablet, Take 1 tablet by mouth Daily., Disp: 30 tablet, Rfl: 2  •  prazosin (MINIPRESS) 2 MG capsule, Take 2 mg by mouth Every Night., Disp: , Rfl:   •  propranolol (INDERAL) 10 MG tablet, , Disp: , Rfl:   •  QUEtiapine (SEROquel) 400 MG tablet, , Disp: , Rfl:   •  rizatriptan (MAXALT) 10 MG tablet, TAKE 1 TABLET BY MOUTH ONE TIME IF NEEDED (SEVERE MIGRAINES. MAY REPEAT X 1 IN 2 HOUR)., Disp: , Rfl:   •  topiramate (TOPAMAX) 50 MG tablet, Take 50 mg by mouth 2 (Two) Times a Day., Disp: , Rfl:   •  Trintellix 20 MG tablet, , Disp: , Rfl:   •  azelastine (ASTELIN) 0.1 % nasal spray, 2 sprays into the nostril(s) as directed by provider 2 (Two) Times a Day. Use in each nostril as directed, Disp: 30 mL, Rfl: 5  •  Azelastine HCl 137 MCG/SPRAY solution, INSTILL 1-2 SPRAYS INTO EACH NOSTRIL TWICE DAILY AS NEEDED, Disp: , Rfl:   •  fexofenadine (ALLEGRA) 180 MG tablet, Take 180 mg by mouth Daily., Disp: , Rfl:     Allergies:   Allergies   Allergen Reactions   • Geodon [Ziprasidone Hcl] Other (See Comments)     Pt becomes hypertensive and develops angioedema   • Medroxyprogesterone Swelling   • Milk-Related Compounds GI Intolerance   • Prednisone Hives   • Morphine And Related Rash  "  • Nickel Rash   • Risperidone And Related Rash   • Sumatriptan Rash       Objective     Physical Exam:  Vital Signs:   Vitals:    04/28/22 0921   BP: 124/76   BP Location: Right arm   Patient Position: Sitting   Cuff Size: Adult   Weight: 73.8 kg (162 lb 12.8 oz)   Height: 149.9 cm (59\")     Body mass index is 32.88 kg/m².     Ears: hearing with  difficulty, auricles intact without deformity, external auditory canals CERUMEN    RIGHT: tympanic membrane intact without perforation or visible effusion    LEFT: tympanic membrane intact without perforation or visible effusion    General: alert, interactive, no acute distress  Head: normocephalic, atraumatic  Nose: no significant external deformity, no epistaxis Scant  Mouth: lips intact without deformity no oral mucosal lesions of concern, no oropharyngeal lesions or significant tonsillar asymmetry, no bleeding  Neck: trachea midline, no neck asymmetry  Lung: no stridor or stertor, no respiratory distress  Cardiovascular: no cyanosis  Skin: no concerning skin lesions on face  Neuro: Cranial nerves II-XII otherwise grossly intact  Eye: no pathologic nystagmus  Extremities: no motor asymmetry on gross examination  Psych: appropriately interactive        Results Review:   Labs:      Imaging:   No Images in the past 120 days found..    These studies were independently reviewed today.     Procedure:   Nasal Endoscopy    Date/Time: 4/28/2022 10:16 AM  Performed by: Tamika Cueto MD  Authorized by: Tamika Cueto MD     Comments:      NASAL ENDOSCOPY:    After patient written consent and topicalization, the endoscope was inserted atraumatically into the bilateral nasal cavities. Septum is deviated RIGHT. Turbinates hypertrophied and inadequately reduce with decongestion.    Scant secretions. No masses or concerning lesions visualized in the nasal cavities or nasopharynx. The patient tolerated the procedure reasonably well.              Assessment / Plan  "     Assessment/Plan:   Diagnoses and all orders for this visit:    1. Family history of deafness or hearing loss (Primary)    2.  (ventriculoperitoneal) shunt status    3. Deviated nasal septum  -     Ambulatory Referral to ENT (Otolaryngology)  -     azelastine (ASTELIN) 0.1 % nasal spray; 2 sprays into the nostril(s) as directed by provider 2 (Two) Times a Day. Use in each nostril as directed  Dispense: 30 mL; Refill: 5  -     Nasal Endoscopy    4. S/P  shunt  -     Ambulatory Referral to ENT (Otolaryngology)  -     azelastine (ASTELIN) 0.1 % nasal spray; 2 sprays into the nostril(s) as directed by provider 2 (Two) Times a Day. Use in each nostril as directed  Dispense: 30 mL; Refill: 5    5. Nasal turbinate hypertrophy  -     Ambulatory Referral to ENT (Otolaryngology)  -     azelastine (ASTELIN) 0.1 % nasal spray; 2 sprays into the nostril(s) as directed by provider 2 (Two) Times a Day. Use in each nostril as directed  Dispense: 30 mL; Refill: 5    6. Nasal obstruction  -     Ambulatory Referral to ENT (Otolaryngology)  -     azelastine (ASTELIN) 0.1 % nasal spray; 2 sprays into the nostril(s) as directed by provider 2 (Two) Times a Day. Use in each nostril as directed  Dispense: 30 mL; Refill: 5    Other orders  -     Cancel: $ Nasal Endsocopy with Debridement         BRYCE HAS DEVIATED SEPTUM AND TURBINATE HYPERTROPHY. SHE IS INTERESTED IN SURGERY. DISCUSSED MY RECOMMENDATION FOR PROCEEDING AT  IN SETTING OF  SHUNT PLACED THERE. REFERRAL PLACED. RX AZELASTINE TO TRY IN THE MEANTIME.      Follow Up:   No follow-ups on file.      TRU Cueto MD

## 2022-04-29 DIAGNOSIS — R35.0 URINARY FREQUENCY: ICD-10-CM

## 2022-04-29 RX ORDER — OXYBUTYNIN CHLORIDE 10 MG/1
10 TABLET, EXTENDED RELEASE ORAL DAILY
Qty: 30 TABLET | Refills: 2 | OUTPATIENT
Start: 2022-04-29

## 2022-05-03 ENCOUNTER — HOSPITAL ENCOUNTER (OUTPATIENT)
Dept: CT IMAGING | Facility: HOSPITAL | Age: 42
Discharge: HOME | End: 2022-05-03
Admitting: PHYSICIAN ASSISTANT

## 2022-05-03 ENCOUNTER — OFFICE VISIT (OUTPATIENT)
Dept: UROLOGY | Facility: CLINIC | Age: 42
End: 2022-05-03

## 2022-05-03 VITALS
HEIGHT: 59 IN | SYSTOLIC BLOOD PRESSURE: 104 MMHG | WEIGHT: 162 LBS | HEART RATE: 100 BPM | OXYGEN SATURATION: 97 % | DIASTOLIC BLOOD PRESSURE: 68 MMHG | BODY MASS INDEX: 32.66 KG/M2 | TEMPERATURE: 97.5 F

## 2022-05-03 DIAGNOSIS — R35.0 URINARY FREQUENCY: ICD-10-CM

## 2022-05-03 DIAGNOSIS — R32 URINARY INCONTINENCE, UNSPECIFIED TYPE: Primary | ICD-10-CM

## 2022-05-03 LAB
BILIRUB BLD-MCNC: NEGATIVE MG/DL
CLARITY, POC: ABNORMAL
COLOR UR: YELLOW
EXPIRATION DATE: ABNORMAL
GLUCOSE UR STRIP-MCNC: NEGATIVE MG/DL
KETONES UR QL: ABNORMAL
LEUKOCYTE EST, POC: NEGATIVE
Lab: ABNORMAL
NITRITE UR-MCNC: NEGATIVE MG/ML
PH UR: 6 [PH] (ref 5–8)
PROT UR STRIP-MCNC: ABNORMAL MG/DL
RBC # UR STRIP: ABNORMAL /UL
SP GR UR: 1.01 (ref 1–1.03)
UROBILINOGEN UR QL: NORMAL

## 2022-05-03 PROCEDURE — 99214 OFFICE O/P EST MOD 30 MIN: CPT | Performed by: PHYSICIAN ASSISTANT

## 2022-05-03 PROCEDURE — 70450 CT HEAD/BRAIN W/O DYE: CPT

## 2022-05-03 RX ORDER — OXYBUTYNIN CHLORIDE 15 MG/1
15 TABLET, EXTENDED RELEASE ORAL DAILY
Qty: 30 TABLET | Refills: 11 | Status: SHIPPED | OUTPATIENT
Start: 2022-05-03 | End: 2022-12-06

## 2022-05-03 NOTE — PROGRESS NOTES
"Chief Complaint   Patient presents with   • Follow-up     8 week follow        HPI  Ms. Walker is a 41 y.o. female with history of  shunt who presents for follow up of OAB.    At this visit, states she has an appointment with the neurosurgeon \"soon\", just waiting on official appointment time. Still having trouble with urinary frequency. She states she does not remember anything about our last appointment 8 weeks ago. She does however state her urinary frequency and urgency have improved quite a bit since starting ditropan. She has had no UUI since starting.    Past Medical History:   Diagnosis Date   • Anemia    • Anxiety    • Depression    • Headache    • Hydrocephalus (HCC)    • Psychiatric illness    • Seizures (HCC)    • Sleep related rhythmic movement disorder    • Stomach ulcer    • Suicide attempt (HCC)    • Urinary tract infection        Past Surgical History:   Procedure Laterality Date   • BRAIN SURGERY  2009, 1990, 1993,   • BUNIONECTOMY Left 2001   • CYST REMOVAL  1996    L wrist   •  SHUNT INSERTION  1980         Current Outpatient Medications:   •  azelastine (ASTELIN) 0.1 % nasal spray, 2 sprays into the nostril(s) as directed by provider 2 (Two) Times a Day. Use in each nostril as directed, Disp: 30 mL, Rfl: 5  •  Azelastine HCl 137 MCG/SPRAY solution, INSTILL 1-2 SPRAYS INTO EACH NOSTRIL TWICE DAILY AS NEEDED, Disp: , Rfl:   •  busPIRone (BUSPAR) 30 MG tablet, , Disp: , Rfl:   •  fexofenadine (ALLEGRA) 180 MG tablet, Take 180 mg by mouth Daily., Disp: , Rfl:   •  loratadine (CLARITIN) 10 MG tablet, Take 10 mg by mouth Daily., Disp: , Rfl:   •  oxybutynin XL (Ditropan XL) 10 MG 24 hr tablet, Take 1 tablet by mouth Daily., Disp: 30 tablet, Rfl: 2  •  prazosin (MINIPRESS) 2 MG capsule, Take 2 mg by mouth Every Night., Disp: , Rfl:   •  propranolol (INDERAL) 10 MG tablet, , Disp: , Rfl:   •  QUEtiapine (SEROquel) 400 MG tablet, , Disp: , Rfl:   •  rizatriptan (MAXALT) 10 MG tablet, TAKE 1 TABLET BY " "MOUTH ONE TIME IF NEEDED (SEVERE MIGRAINES. MAY REPEAT X 1 IN 2 HOUR)., Disp: , Rfl:   •  topiramate (TOPAMAX) 50 MG tablet, Take 50 mg by mouth 2 (Two) Times a Day., Disp: , Rfl:   •  Trintellix 20 MG tablet, , Disp: , Rfl:      Physical Exam  Visit Vitals  /68   Pulse 100   Temp 97.5 °F (36.4 °C)   Ht 149.9 cm (59\")   Wt 73.5 kg (162 lb)   SpO2 97%   BMI 32.72 kg/m²       Labs  Brief Urine Lab Results  (Last result in the past 365 days)      Color   Clarity   Blood   Leuk Est   Nitrite   Protein   CREAT   Urine HCG        05/03/22 0928 Yellow   Slightly Cloudy   1+   Negative   Negative   Trace                 Lab Results   Component Value Date    GLUCOSE 92 10/12/2020    CALCIUM 9.0 10/12/2020     10/12/2020    K 3.9 10/12/2020    CO2 20.7 (L) 10/12/2020     (H) 10/12/2020    BUN 12 10/12/2020    CREATININE 0.73 10/12/2020    EGFRIFNONA 89 10/12/2020    BCR 16.4 10/12/2020    ANIONGAP 8.3 10/12/2020       Lab Results   Component Value Date    WBC 10.27 10/12/2020    HGB 14.7 10/12/2020    HCT 44.1 10/12/2020    MCV 92.5 10/12/2020     10/12/2020     PVR  Post-void residual performed with ultrasound scanner by staff and interpreted by me - [facility]      Assessment  41 y.o. female with history of  shunt presenting for follow up. As above, she states she has no memory of meeting me. She was not able to get the CT done to evaluate for hydrocephalus due to confusion with the group facility and scheduling.     She does state that she has seen benefit from Oxybutynin. Her frequency and urgency have improved. She denies noticing any side effects and her PVR remains scant. She does feel like it could be improved further, specifically her urinary frequency and near constant urge to go.     Plan  1. Increase oxybutynin XL to 15mg  2. Obtain CT head STAT due to concern for hydrocephalus     If no acute CT findings, FU in 3 months for reassessment of OAB      "

## 2022-06-08 ENCOUNTER — TELEPHONE (OUTPATIENT)
Dept: OTOLARYNGOLOGY | Facility: CLINIC | Age: 42
End: 2022-06-08

## 2022-06-08 NOTE — TELEPHONE ENCOUNTER
Livia from [facility] called in reference to patient's referral to be seen for appointment.      Livia requested for giving her a call at:    [phone]

## 2022-06-13 NOTE — TELEPHONE ENCOUNTER
LEFT MESSAGE FOR GERI AT [facility]  TO CALL ME BACK, IF NEEDED.

## 2022-08-29 ENCOUNTER — TRANSCRIBE ORDERS (OUTPATIENT)
Dept: ADMINISTRATIVE | Facility: HOSPITAL | Age: 42
End: 2022-08-29

## 2022-08-29 DIAGNOSIS — Z12.31 ENCOUNTER FOR SCREENING MAMMOGRAM FOR MALIGNANT NEOPLASM OF BREAST: Primary | ICD-10-CM

## 2022-12-06 NOTE — PROGRESS NOTES
Received a phone call from the patient's caregiver at [facility].  The patient has had new outbursts with inappropriate behavior, cursing, violence.  Her family reportedly believes this is related to the oxybutynin that she has been on since May.  They therefore would like to do a trial of stopping.  She has no power of  and is capable of making her own medical decisions.  Livia however confirms that the patient agrees with her family and would like to do a trial of stopping this medication.  I therefore have agreed the medication can be stopped immediately if they have concerns for adverse effects.  I would prefer to follow-up with the patient in person to discuss new medications if her incontinence worsens again.  Livia states they will call to make an appointment if that is the case.

## 2023-03-27 ENCOUNTER — TRANSCRIBE ORDERS (OUTPATIENT)
Dept: ADMINISTRATIVE | Facility: HOSPITAL | Age: 43
End: 2023-03-27
Payer: MEDICAID

## 2023-03-27 DIAGNOSIS — Z12.39 ENCOUNTER FOR OTHER SCREENING FOR MALIGNANT NEOPLASM OF BREAST: Primary | ICD-10-CM

## 2023-04-06 ENCOUNTER — HOSPITAL ENCOUNTER (OUTPATIENT)
Dept: MAMMOGRAPHY | Facility: HOSPITAL | Age: 43
Discharge: HOME | End: 2023-04-06
Admitting: NURSE PRACTITIONER
Payer: MEDICAID

## 2023-04-06 DIAGNOSIS — Z12.39 ENCOUNTER FOR OTHER SCREENING FOR MALIGNANT NEOPLASM OF BREAST: ICD-10-CM

## 2023-04-06 PROCEDURE — 77063 BREAST TOMOSYNTHESIS BI: CPT

## 2023-04-06 PROCEDURE — 77067 SCR MAMMO BI INCL CAD: CPT

## 2023-04-12 ENCOUNTER — TRANSCRIBE ORDERS (OUTPATIENT)
Dept: ADMINISTRATIVE | Facility: HOSPITAL | Age: 43
End: 2023-04-12
Payer: MEDICAID

## 2023-04-12 DIAGNOSIS — R92.8 ABNORMAL MAMMOGRAM: Primary | ICD-10-CM

## 2023-05-31 ENCOUNTER — TRANSCRIBE ORDERS (OUTPATIENT)
Dept: ADMINISTRATIVE | Facility: HOSPITAL | Age: 43
End: 2023-05-31

## 2023-05-31 DIAGNOSIS — R22.32 MASS OF LEFT UPPER EXTREMITY: Primary | ICD-10-CM

## 2023-08-28 ENCOUNTER — HOSPITAL ENCOUNTER (OUTPATIENT)
Dept: ULTRASOUND IMAGING | Facility: HOSPITAL | Age: 43
Discharge: HOME | End: 2023-08-28
Admitting: NURSE PRACTITIONER
Payer: MEDICAID

## 2023-08-28 DIAGNOSIS — R22.32 MASS OF LEFT UPPER EXTREMITY: ICD-10-CM

## 2023-08-28 PROCEDURE — 76882 US LMTD JT/FCL EVL NVASC XTR: CPT

## 2023-11-09 ENCOUNTER — TRANSCRIBE ORDERS (OUTPATIENT)
Dept: ADMINISTRATIVE | Facility: HOSPITAL | Age: 43
End: 2023-11-09
Payer: MEDICAID

## 2023-11-09 DIAGNOSIS — R22.32 LOCALIZED SWELLING, MASS AND LUMP, UPPER LIMB, LEFT: Primary | ICD-10-CM

## 2023-12-06 ENCOUNTER — HOSPITAL ENCOUNTER (OUTPATIENT)
Dept: MRI IMAGING | Facility: HOSPITAL | Age: 43
Discharge: HOME | End: 2023-12-06
Admitting: PHYSICIAN ASSISTANT
Payer: MEDICAID

## 2023-12-06 DIAGNOSIS — R22.32 LOCALIZED SWELLING, MASS AND LUMP, UPPER LIMB, LEFT: ICD-10-CM

## 2023-12-06 PROCEDURE — A9577 INJ MULTIHANCE: HCPCS | Performed by: PHYSICIAN ASSISTANT

## 2023-12-06 PROCEDURE — 0 GADOBENATE DIMEGLUMINE 529 MG/ML SOLUTION: Performed by: PHYSICIAN ASSISTANT

## 2023-12-06 PROCEDURE — 73220 MRI UPPR EXTREMITY W/O&W/DYE: CPT

## 2023-12-06 RX ADMIN — GADOBENATE DIMEGLUMINE 15 ML: 529 INJECTION, SOLUTION INTRAVENOUS at 13:07

## 2024-05-23 ENCOUNTER — OFFICE VISIT (OUTPATIENT)
Dept: OBSTETRICS AND GYNECOLOGY | Facility: CLINIC | Age: 44
End: 2024-05-23
Payer: MEDICAID

## 2024-05-23 VITALS
SYSTOLIC BLOOD PRESSURE: 110 MMHG | DIASTOLIC BLOOD PRESSURE: 80 MMHG | WEIGHT: 169.4 LBS | HEIGHT: 59 IN | BODY MASS INDEX: 34.15 KG/M2

## 2024-05-23 DIAGNOSIS — Z87.42 HISTORY OF DYSMENORRHEA: ICD-10-CM

## 2024-05-23 DIAGNOSIS — Z87.42 HISTORY OF MENORRHAGIA: Primary | ICD-10-CM

## 2024-05-23 RX ORDER — LEVOCETIRIZINE DIHYDROCHLORIDE 5 MG/1
TABLET, FILM COATED ORAL
COMMUNITY
Start: 2024-04-25

## 2024-05-23 RX ORDER — ESLICARBAZEPINE ACETATE 400 MG/1
TABLET ORAL
COMMUNITY
Start: 2024-05-22

## 2024-05-23 RX ORDER — RIMEGEPANT SULFATE 75 MG/75MG
TABLET, ORALLY DISINTEGRATING ORAL
COMMUNITY
Start: 2024-04-29

## 2024-05-23 RX ORDER — ALBUTEROL SULFATE 90 UG/1
AEROSOL, METERED RESPIRATORY (INHALATION)
COMMUNITY
Start: 2024-01-30

## 2024-05-23 RX ORDER — OLOPATADINE HYDROCHLORIDE 2 MG/ML
SOLUTION/ DROPS OPHTHALMIC
COMMUNITY
Start: 2024-03-05

## 2024-05-23 RX ORDER — BUDESONIDE AND FORMOTEROL FUMARATE DIHYDRATE 160; 4.5 UG/1; UG/1
2 AEROSOL RESPIRATORY (INHALATION) 2 TIMES DAILY
COMMUNITY
Start: 2024-03-05

## 2024-05-23 RX ORDER — SERTRALINE HYDROCHLORIDE 100 MG/1
TABLET, FILM COATED ORAL
COMMUNITY
Start: 2024-04-23

## 2024-05-23 RX ORDER — ERGOCALCIFEROL 1.25 MG/1
1 CAPSULE ORAL WEEKLY
COMMUNITY
Start: 2024-03-05

## 2024-05-23 RX ORDER — LEVETIRACETAM 750 MG/1
750 TABLET ORAL EVERY 12 HOURS
COMMUNITY
Start: 2024-02-19

## 2024-05-23 NOTE — PROGRESS NOTES
Subjective   Chief Complaint   Patient presents with    Abdominal Pain     New Patient with irregular periods. She has an IUD, does not know what kind and she thinks it was put in 2 years ago.       Silva Walker is a 43 y.o. year old  presenting to be seen for menstrual issues.  Patient had wanted to get pap done but started menses earlier today  She has Mirena IUD. Unsure exactly when Mirena placed but reviewing chart a pelvic ultrasound done in 2017 noted IUD in uterus.  Poor historian  States IUD placed for heavy and painful menses. The IUD improved menses significantly. She has monthly bleeds that last 3 days and are light  She is unsure when last pap done but thinks at least 2 years ago  Wants to get IUD changed out       Past Medical History:   Diagnosis Date    Abnormal Pap smear of cervix     Anemia     Anxiety     Depression     Headache     History of transfusion     Hydrocephalus     Migraine     PID (pelvic inflammatory disease)     PMS (premenstrual syndrome)     Polycystic ovary syndrome     Psychiatric illness     Seizures     Sleep related rhythmic movement disorder     Stomach ulcer     Suicide attempt     Urinary tract infection         Current Outpatient Medications:     Aptiom 400 MG tablet, , Disp: , Rfl:     azelastine (ASTELIN) 0.1 % nasal spray, 2 sprays into the nostril(s) as directed by provider 2 (Two) Times a Day. Use in each nostril as directed, Disp: 30 mL, Rfl: 5    Azelastine HCl 137 MCG/SPRAY solution, INSTILL 1-2 SPRAYS INTO EACH NOSTRIL TWICE DAILY AS NEEDED, Disp: , Rfl:     busPIRone (BUSPAR) 30 MG tablet, , Disp: , Rfl:     fexofenadine (ALLEGRA) 180 MG tablet, Take 1 tablet by mouth Daily., Disp: , Rfl:     levETIRAcetam (KEPPRA) 750 MG tablet, Take 1 tablet by mouth Every 12 (Twelve) Hours., Disp: , Rfl:     levocetirizine (XYZAL) 5 MG tablet, , Disp: , Rfl:     loratadine (CLARITIN) 10 MG tablet, Take 1 tablet by mouth Daily., Disp: , Rfl:     Nurtec 75 MG  dispersible tablet, , Disp: , Rfl:     olopatadine (PATADAY) 0.2 % solution ophthalmic solution, PLACE 1 DROP INTO AFFECTED EYE ONCE A DAY FOR 7 DAYS, Disp: , Rfl:     prazosin (MINIPRESS) 2 MG capsule, Take 1 capsule by mouth Every Night., Disp: , Rfl:     propranolol (INDERAL) 10 MG tablet, , Disp: , Rfl:     QUEtiapine (SEROquel) 400 MG tablet, , Disp: , Rfl:     rizatriptan (MAXALT) 10 MG tablet, TAKE 1 TABLET BY MOUTH ONE TIME IF NEEDED (SEVERE MIGRAINES. MAY REPEAT X 1 IN 2 HOUR)., Disp: , Rfl:     sertraline (ZOLOFT) 100 MG tablet, , Disp: , Rfl:     Symbicort 160-4.5 MCG/ACT inhaler, Inhale 2 puffs 2 (Two) Times a Day., Disp: , Rfl:     topiramate (TOPAMAX) 50 MG tablet, Take 1 tablet by mouth 2 (Two) Times a Day., Disp: , Rfl:     Trintellix 20 MG tablet, , Disp: , Rfl:     Ventolin  (90 Base) MCG/ACT inhaler, INHALE 1 PUFF EVERY 4 TO 6 HOURS AS NEEDED, Disp: , Rfl:     vitamin D (ERGOCALCIFEROL) 1.25 MG (87098 UT) capsule capsule, Take 1 capsule by mouth 1 (One) Time Per Week., Disp: , Rfl:    Allergies   Allergen Reactions    Geodon [Ziprasidone Hcl] Other (See Comments)     Pt becomes hypertensive and develops angioedema    Medroxyprogesterone Swelling    Milk-Related Compounds GI Intolerance    Prednisone Hives    Morphine And Related Rash    Nickel Rash    Risperidone And Related Rash    Sumatriptan Rash      Past Surgical History:   Procedure Laterality Date    BRAIN SURGERY  2009, 1990, 1993,    BUNIONECTOMY Left 2001    CYST REMOVAL  1996    L wrist     SHUNT INSERTION  1980      Social History     Socioeconomic History    Marital status: Single   Tobacco Use    Smoking status: Never    Smokeless tobacco: Never   Vaping Use    Vaping status: Never Used   Substance and Sexual Activity    Alcohol use: Yes    Drug use: No    Sexual activity: Not Currently     Birth control/protection: Abstinence, I.U.D.      Family History   Problem Relation Age of Onset    Breast cancer Father     Prostate  "cancer Father     Heart disease Father     Hyperlipidemia Father     Osteoporosis Mother     Stroke Mother     Bipolar disorder Mother     Alcohol abuse Mother        Review of Systems   Constitutional:  Negative for chills, diaphoresis and fever.   Genitourinary:  Positive for vaginal bleeding. Negative for difficulty urinating, dysuria, menstrual problem, pelvic pain and vaginal discharge.   Psychiatric/Behavioral:  Positive for sleep disturbance.            Objective   /80   Ht 149.9 cm (59\")   Wt 76.8 kg (169 lb 6.4 oz)   LMP 05/23/2024   BMI 34.21 kg/m²     Physical Exam  Constitutional:       Appearance: Normal appearance. She is well-developed and well-groomed.   Eyes:      General: Lids are normal.      Extraocular Movements: Extraocular movements intact.      Conjunctiva/sclera: Conjunctivae normal.   Neurological:      General: No focal deficit present.      Mental Status: She is alert and oriented to person, place, and time.   Psychiatric:         Attention and Perception: Attention normal.         Mood and Affect: Mood normal.         Speech: Speech normal.         Behavior: Behavior is cooperative.            Result Review :           US Non-OB Transvaginal (05/26/2017 11:52)         Assessment and Plan  Diagnoses and all orders for this visit:    1. History of menorrhagia (Primary)    2. History of dysmenorrhea      Patient Instructions   Will RTO 2-3 weeks for annual/pap  Will plan on changing out Mirena after pap updated             This note was electronically signed.    Yuridia Mahajan PA-C   May 23, 2024  "